# Patient Record
(demographics unavailable — no encounter records)

---

## 2024-11-05 NOTE — HISTORY OF PRESENT ILLNESS
[FreeTextEntry1] : 77-year-old male with a history of TBI, CAD hyperlipidemia atrial fibrillation on anticoagulation comes in accompanied by his wife for follow-up.  She reports as of late patient's blood pressure and heart rate have been quite labile.  Overall patient is at baseline denies chest pain shortness of breath PND orthopnea palpitations.

## 2024-11-05 NOTE — DISCUSSION/SUMMARY
[EKG obtained to assist in diagnosis and management of assessed problem(s)] : EKG obtained to assist in diagnosis and management of assessed problem(s) [FreeTextEntry1] : 1.  Atrial fibrillation: EKG reviewed A-fib with RVR will increase metoprolol to 50 mg daily prescription sent to the pharmacy.  Continue Eliquis 2.  Ischemic cardiomyopathy: Echocardiogram continue current meds for now 3.  Carotid bruit: Carotid duplex 4.  CAD: Continue current meds 5.  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  Will reconsider starting Entresto in 1 month when we revisit a follow-up echo to assess LV function.  Will follow-up in 2 weeks with a telehealth visit to assess heart rate response to metoprolol.

## 2024-11-05 NOTE — PHYSICAL EXAM

## 2024-11-05 NOTE — PHYSICAL EXAM

## 2024-11-13 NOTE — PLAN
[FreeTextEntry1] : 1.  Acute UTI: Patient dropped off a urine sample to be sent to the lab for urinalysis and urine culture.  Will empirically start Cipro 500 mg twice daily for 7 days will advise once results of culture is known.

## 2024-11-13 NOTE — HISTORY OF PRESENT ILLNESS
[FreeTextEntry8] : Telephone encounter obtained with patient in his home with his wife at his side with permission granted.  77-year-old male with extensive past medical history traumatic brain injury.  As per his wife's had a change in mental status which typically signals a UTI.  She did an over-the-counter UTI test which she reports is positive.  Denies any fever.

## 2024-11-20 NOTE — REASON FOR VISIT
[FreeTextEntry1] : Telephone encounter obtained with patient in his home with his wife on the phone and myself in the office with permission granted by all parties.  77-year-old male with an extensive past medical history.  Telephone encounter obtained to discuss patient's response to last visit his medication changes specifically discontinue Entresto.  In the interim was diagnosed with a UTI or from over-the-counter test.  He is now completing antibiotics with some improvement.  However, as per his wife he still has some unsteady gait and fatigue does have urinary incontinence as well.  Has not seen a neurologist in years.  Denies any chest pain shortness of breath PND orthopnea.

## 2024-11-20 NOTE — DISCUSSION/SUMMARY
[FreeTextEntry1] : 1.  UTI: Patient improving advised to follow-up with urology as scheduled 2.  Atrial fibrillation: Continue current medication  Discussed the utility of getting a CT of the head to rule out NPH considering the patient has had a decline in his ability to mobilize and has been having urinary incontinence.  Patient's wife does not make an appoint with neurology.

## 2024-12-16 NOTE — HISTORY OF PRESENT ILLNESS
[FreeTextEntry1] : 78-year-old male with past medical history of cardiomyopathy atrial fibrillation ischemic cardiomyopathy comes in accompanied by his son and.  Patient recently hospitalized for acute on chronic systolic CHF exacerbation in the setting of UTI.  Patient currently more or less back to baseline.  Denies any chest pain shortness of breath PND orthopnea.

## 2024-12-16 NOTE — DISCUSSION/SUMMARY
[EKG obtained to assist in diagnosis and management of assessed problem(s)] : EKG obtained to assist in diagnosis and management of assessed problem(s) [FreeTextEntry1] : 1.  Atrial fibrillation: Currently well-controlled on current meds we will continue for now.  Obtain a digoxin level and advise once results are known. 2.  Cardiomyopathy: Continue beta-blocker and Entresto  3.  CAD: Continue current medication Reviewed hospital record EF 20 to 25% compared to 35 to 40% in November.  Likely in the setting of acute CHF.

## 2024-12-16 NOTE — PHYSICAL EXAM

## 2025-02-25 NOTE — DISCUSSION/SUMMARY
[FreeTextEntry1] : 1.  Atrial fibrillation: Advised to continue current medication.  EKG reviewed atrial fibs unchanged from prior tracings. 2.  Cardiomyopathy: Continue current meds patient follows with EP in [facility] 3.  Hyperlipidemia: Continue statin [EKG obtained to assist in diagnosis and management of assessed problem(s)] : EKG obtained to assist in diagnosis and management of assessed problem(s)

## 2025-02-25 NOTE — PHYSICAL EXAM

## 2025-02-25 NOTE — HISTORY OF PRESENT ILLNESS
[FreeTextEntry1] : 70-year-old male with a past medical history of atrial fibrillation cardiomyopathy status post ICD hyperlipidemia comes in accompanied by his wife for follow-up.  Overall, feels well denies chest pain shortness of breath PND orthopnea.

## 2025-05-27 NOTE — DISCUSSION/SUMMARY
[FreeTextEntry1] : 1.  Ischemic cardiomyopathy: Advised to increase furosemide twice a week to 40 mg and 5 days a week 20 mg continue daily weights.  On average patient's weight 173 pounds.  Advised to continue daily weights 2.  Hypertension: Continue current medication.

## 2025-05-27 NOTE — HISTORY OF PRESENT ILLNESS
[FreeTextEntry1] : Quick 70-year-old male with a past medical history of ischemic cardiomyopathy atrial fibrillation comes in accompanied by his wife for blood test follow-up.  Overall he feels well denies chest pain shortness of breath PND orthopnea.  However BNP rising.

## 2025-05-27 NOTE — PHYSICAL EXAM
